# Patient Record
Sex: FEMALE | Race: WHITE | NOT HISPANIC OR LATINO | Employment: OTHER | URBAN - METROPOLITAN AREA
[De-identification: names, ages, dates, MRNs, and addresses within clinical notes are randomized per-mention and may not be internally consistent; named-entity substitution may affect disease eponyms.]

---

## 2017-08-23 ENCOUNTER — OFFICE VISIT (OUTPATIENT)
Dept: NEUROSURGERY | Facility: CLINIC | Age: 42
End: 2017-08-23

## 2017-08-23 VITALS
DIASTOLIC BLOOD PRESSURE: 88 MMHG | BODY MASS INDEX: 32.78 KG/M2 | SYSTOLIC BLOOD PRESSURE: 142 MMHG | HEIGHT: 63 IN | TEMPERATURE: 98.3 F | WEIGHT: 185 LBS

## 2017-08-23 DIAGNOSIS — G91.1 OBSTRUCTIVE HYDROCEPHALUS (HCC): Primary | ICD-10-CM

## 2017-08-23 PROCEDURE — 99214 OFFICE O/P EST MOD 30 MIN: CPT | Performed by: PHYSICIAN ASSISTANT

## 2017-08-23 RX ORDER — OMEPRAZOLE 40 MG/1
40 CAPSULE, DELAYED RELEASE ORAL DAILY
COMMUNITY

## 2017-08-23 RX ORDER — ESTRADIOL 2 MG/1
2 TABLET ORAL DAILY
COMMUNITY

## 2017-08-23 RX ORDER — CYCLOBENZAPRINE HCL 10 MG
10 TABLET ORAL 3 TIMES DAILY PRN
COMMUNITY
End: 2017-08-23

## 2017-08-23 RX ORDER — CARBAMAZEPINE 200 MG/1
200 TABLET ORAL 2 TIMES DAILY
COMMUNITY
End: 2018-11-15

## 2017-08-23 RX ORDER — DICLOFENAC SODIUM 75 MG/1
75 TABLET, DELAYED RELEASE ORAL 2 TIMES DAILY
COMMUNITY
End: 2017-08-23

## 2017-08-23 NOTE — PROGRESS NOTES
"Patient: Yaquelin Rojas  : 1975  Chart #: [id number]    Date of Service: 2017    CHIEF COMPLAINT: Headache    History of Present Illness Ms. Rojas is a 42-year-old female who is well-known to our clinic.  She has a history of hydrocephalus and has undergone multiple shunt interventions in the past. The last one being in the spring of . She developed an infection and had hardware removed. She underwent CSF diversion for a while then a new right frontal system was placed. She continued to have difficulties and developed an enlarged left occipital horn and underwent a separate shunt procedure to shunt the left occipital horn.   She should have two active and functioning shunt systems.  About a month ago she had a episode of \"feeling off balance\". This was transient and has not recurred. She thought at that time she should have her shunt checked since her last appointment was 3 years ago.  She has occasional headache. No nausea or vomiting. No visual disturbance.    The following portions of the patient's history were reviewed and updated as appropriate: allergies, current medications, past family history, past medical history, past social history, past surgical history and problem list.    Review of Systems   Constitutional: Negative for activity change, appetite change, chills, diaphoresis, fatigue, fever and unexpected weight change.   HENT: Positive for rhinorrhea. Negative for congestion, dental problem, drooling, ear discharge, ear pain, facial swelling, hearing loss, mouth sores, nosebleeds, postnasal drip, sinus pressure, sneezing, sore throat, tinnitus, trouble swallowing and voice change.    Eyes: Positive for photophobia. Negative for pain, discharge, redness, itching and visual disturbance.   Respiratory: Negative for apnea, cough, choking, chest tightness, shortness of breath, wheezing and stridor.    Cardiovascular: Negative for chest pain, palpitations and leg swelling. " "  Gastrointestinal: Positive for constipation. Negative for abdominal distention, abdominal pain, anal bleeding, blood in stool, diarrhea, nausea, rectal pain and vomiting.   Endocrine: Negative for cold intolerance, heat intolerance, polydipsia, polyphagia and polyuria.   Genitourinary: Negative for decreased urine volume, difficulty urinating, dysuria, enuresis, flank pain, frequency, genital sores, hematuria and urgency.   Musculoskeletal: Negative for arthralgias, back pain, gait problem, joint swelling, myalgias, neck pain and neck stiffness.   Skin: Negative for color change, pallor, rash and wound.   Allergic/Immunologic: Negative for environmental allergies, food allergies and immunocompromised state.   Neurological: Positive for numbness. Negative for dizziness, tremors, seizures, syncope, facial asymmetry, speech difficulty, weakness, light-headedness and headaches.   Hematological: Negative for adenopathy. Does not bruise/bleed easily.   Psychiatric/Behavioral: Negative for agitation, behavioral problems, confusion, decreased concentration, dysphoric mood, hallucinations, self-injury, sleep disturbance and suicidal ideas. The patient is not nervous/anxious and is not hyperactive.        Objective   Vital Signs: Blood pressure 142/88, temperature 98.3 °F (36.8 °C), height 63\" (160 cm), weight 185 lb (83.9 kg).  Physical Exam   Constitutional: She appears well-developed and well-nourished. No distress.   HENT:   Head: Normocephalic and atraumatic.   Eyes: EOM are normal. Pupils are equal, round, and reactive to light.   Cardiovascular: Normal rate and regular rhythm.    Pulmonary/Chest: Effort normal and breath sounds normal.   Abdominal: Soft. She exhibits no distension.   Skin:   Shunt system is palpated in the right frontal region with tubing extending down the neck. Pumps and refills easily. It a bit more difficult to palpate the left sided system, felt to be in the left occipital region   Psychiatric: " She has a normal mood and affect. Her behavior is normal. Thought content normal.   Nursing note and vitals reviewed.  Musculoskeletal:  Strength is intact in upper and lower extremities to direct testing.  Muscle tone is normal throughout.  Station and gait are normal.  Neurologic:  Gait: Able to tandem walk without difficulty.  Coordination is intact.  Finger to nose, heel-to-shin, rapid alternating movements.  Deep tendon reflexes: 2+ and symmetrical.  Sensation is intact to light touch throughout.  Patient is oriented to person, place, and time.      Assessment/Plan    Diagnosis: Hydrocephalus with numerous shunt interventions    Medical Decision Making: Ms. Rojas is doing well. We will go ahead and check a CT of the brain to make sure her shunt systems are in order. We will also check a shunt series. She will follow up with Dr. Brooke.                Darby Virk PA-C  Patient Care Team:  Dania Singleton MD as PCP - General (Family Medicine)  Dania Singleton MD as Referring Physician (Family Medicine)

## 2017-09-01 ENCOUNTER — HOSPITAL ENCOUNTER (OUTPATIENT)
Dept: GENERAL RADIOLOGY | Facility: HOSPITAL | Age: 42
Discharge: HOME | End: 2017-09-01

## 2017-09-01 ENCOUNTER — HOSPITAL ENCOUNTER (OUTPATIENT)
Dept: CT IMAGING | Facility: HOSPITAL | Age: 42
Discharge: HOME | End: 2017-09-01
Admitting: PHYSICIAN ASSISTANT

## 2017-09-01 ENCOUNTER — OFFICE VISIT (OUTPATIENT)
Dept: NEUROSURGERY | Facility: CLINIC | Age: 42
End: 2017-09-01

## 2017-09-01 VITALS — HEIGHT: 63 IN | TEMPERATURE: 97 F | BODY MASS INDEX: 32.96 KG/M2 | WEIGHT: 186 LBS

## 2017-09-01 DIAGNOSIS — G91.1 OBSTRUCTIVE HYDROCEPHALUS (HCC): ICD-10-CM

## 2017-09-01 DIAGNOSIS — Z92.89 HISTORY OF VENTRICULOPERITONEAL SHUNTING: ICD-10-CM

## 2017-09-01 DIAGNOSIS — G91.1 OBSTRUCTIVE HYDROCEPHALUS (HCC): Primary | ICD-10-CM

## 2017-09-01 PROCEDURE — 99212 OFFICE O/P EST SF 10 MIN: CPT | Performed by: NEUROLOGICAL SURGERY

## 2017-09-01 PROCEDURE — 74000 HC ABDOMEN KUB: CPT

## 2017-09-01 PROCEDURE — 71020 HC CHEST PA AND LATERAL: CPT

## 2017-09-01 PROCEDURE — 70450 CT HEAD/BRAIN W/O DYE: CPT

## 2017-09-01 PROCEDURE — 70250 X-RAY EXAM OF SKULL: CPT

## 2017-09-01 RX ORDER — FLUNISOLIDE 0.25 MG/ML
2 SOLUTION NASAL EVERY 12 HOURS
COMMUNITY

## 2017-09-01 NOTE — PROGRESS NOTES
Patient: Yaquelin Rojas  : 1975    Primary Care Provider: Dania Singleton MD    Requesting Provider: As above        History    Chief Complaint: Headache.    History of Present Illness: Ms. Rojas is a 42-year-old female who is well-known to our clinic.  She has a history of hydrocephalus and has undergone multiple shunt interventions in the past. The last one being in the spring of 2009. She developed an infection and had hardware removed. She underwent CSF diversion for a while then a new right frontal system was placed. She continued to have difficulties and developed an enlarged left occipital horn and underwent a separate shunt procedure to shunt the left occipital horn.   She should have two active and functioning shunt systems.  About a month ago she had a episode of feeling dizzy or off-balance. This was transient and has not recurred. She thought at that time she should have her shunt checked since her last appointment was 3 years ago.  She has occasional headache. No nausea or vomiting. No visual disturbance.    Review of Systems   Constitutional: Negative for activity change, appetite change, chills, diaphoresis, fatigue, fever and unexpected weight change.   HENT: Negative for congestion, dental problem, drooling, ear discharge, ear pain, facial swelling, hearing loss, mouth sores, nosebleeds, postnasal drip, rhinorrhea, sinus pressure, sneezing, sore throat, tinnitus, trouble swallowing and voice change.    Eyes: Negative for photophobia, pain, discharge, redness, itching and visual disturbance.   Respiratory: Negative for apnea, cough, choking, chest tightness, shortness of breath, wheezing and stridor.    Cardiovascular: Negative for chest pain, palpitations and leg swelling.   Gastrointestinal: Negative for abdominal distention, abdominal pain, anal bleeding, blood in stool, constipation, diarrhea, nausea, rectal pain and vomiting.   Endocrine: Negative for cold intolerance, heat  "intolerance, polydipsia, polyphagia and polyuria.   Genitourinary: Negative for decreased urine volume, difficulty urinating, dysuria, enuresis, flank pain, frequency, genital sores, hematuria and urgency.   Musculoskeletal: Negative for arthralgias, back pain, gait problem, joint swelling, myalgias, neck pain and neck stiffness.   Skin: Negative for color change, pallor, rash and wound.   Allergic/Immunologic: Negative for environmental allergies, food allergies and immunocompromised state.   Neurological: Positive for headaches. Negative for dizziness, tremors, seizures, syncope, facial asymmetry, speech difficulty, weakness, light-headedness and numbness.   Hematological: Negative for adenopathy. Does not bruise/bleed easily.   Psychiatric/Behavioral: Negative for agitation, behavioral problems, confusion, decreased concentration, dysphoric mood, hallucinations, self-injury, sleep disturbance and suicidal ideas. The patient is not nervous/anxious and is not hyperactive.    All other systems reviewed and are negative.      The patient's past medical history, past surgical history, family history, and social history have been reviewed at length in the electronic medical record.    Physical Exam:   Temp 97 °F (36.1 °C)  Ht 63\" (160 cm)  Wt 186 lb (84.4 kg)  BMI 32.95 kg/m2  Yaquelin is awake and alert and appears her usual self.  Her speech is fluent and she follows all commands.  There is no pronator drift.  Her gait is stable and independent.  Both shunt valves pump and refill easily.    Medical Decision Making    Data Review:   Her x-ray shunt series demonstrates both of her shunt systems which are active to be contiguous.  Her CT of the brain is unchanged when compared to a study of 2014.  There are multiple shunt catheters in place.    Diagnosis:   Hydrocephalus, stable without evidence of shunt malfunction.    Treatment Options:   Yaquelin will follow-up as needed.       Diagnosis Plan   1. Obstructive " hydrocephalus     2. History of ventriculoperitoneal shunting         Scribed for Rosas Brooke MD by Ally Shipman CMA on 09/01/2017 at 12:11 PM    I, Dr. Brooke, personally performed the services described in the documentation, as scribed in my presence, and it is both accurate and complete.

## 2018-06-18 ENCOUNTER — TELEPHONE (OUTPATIENT)
Dept: NEUROSURGERY | Facility: CLINIC | Age: 43
End: 2018-06-18

## 2018-06-18 DIAGNOSIS — R56.9 SEIZURE-LIKE ACTIVITY (HCC): ICD-10-CM

## 2018-06-18 DIAGNOSIS — G91.1 OBSTRUCTIVE HYDROCEPHALUS (HCC): Primary | ICD-10-CM

## 2018-06-18 DIAGNOSIS — Z98.2 S/P VP SHUNT: ICD-10-CM

## 2018-06-18 NOTE — TELEPHONE ENCOUNTER
Pt's mother called in to let us know that the patient had a seizure.  She wanted to get an appointment with Dr. Brooke scheduled.  Does she need to see us and does she need any scans prior?

## 2018-06-18 NOTE — TELEPHONE ENCOUNTER
Spoke with patient's mother. Patient has seizure medication: Dr. Singleton manages.   This is the first seizure that she had since her last appointment; her eyes rolled back and she slumped out of a chair. She has no memory of the event.   I'll put in orders for a CT head without and a shunt series. She was also told to make sure the doctor prescribing her tegretol is aware of this event.

## 2018-06-27 ENCOUNTER — OFFICE VISIT (OUTPATIENT)
Dept: NEUROSURGERY | Facility: CLINIC | Age: 43
End: 2018-06-27

## 2018-06-27 ENCOUNTER — HOSPITAL ENCOUNTER (OUTPATIENT)
Dept: CT IMAGING | Facility: HOSPITAL | Age: 43
Discharge: HOME | End: 2018-06-27
Admitting: PHYSICIAN ASSISTANT

## 2018-06-27 VITALS — TEMPERATURE: 97.7 F | HEIGHT: 63 IN | WEIGHT: 186 LBS | BODY MASS INDEX: 32.96 KG/M2

## 2018-06-27 DIAGNOSIS — R56.9 SEIZURE-LIKE ACTIVITY (HCC): ICD-10-CM

## 2018-06-27 DIAGNOSIS — Z98.2 S/P VP SHUNT: ICD-10-CM

## 2018-06-27 DIAGNOSIS — Z92.89 HISTORY OF VENTRICULOPERITONEAL SHUNTING: Primary | ICD-10-CM

## 2018-06-27 DIAGNOSIS — G91.1 OBSTRUCTIVE HYDROCEPHALUS (HCC): ICD-10-CM

## 2018-06-27 PROCEDURE — 99213 OFFICE O/P EST LOW 20 MIN: CPT | Performed by: NEUROLOGICAL SURGERY

## 2018-06-27 PROCEDURE — 70450 CT HEAD/BRAIN W/O DYE: CPT

## 2018-06-27 NOTE — PROGRESS NOTES
Patient: Yaquelin Rojas  : 1975    Primary Care Provider: Dania Singleton MD    Requesting Provider: As above        History    Chief Complaint: Syncopal episode.    History of Present Illness: Ms. Rojas is a 43-year-old female who is well-known to our clinic.  She has a history of hydrocephalus and has undergone multiple shunt interventions in the past. The last one being in the spring of 2009. She developed an infection and had hardware removed. She underwent CSF diversion for a while then a new right frontal system was placed. She continued to have difficulties and developed an enlarged left occipital horn and underwent a separate shunt procedure to shunt the left occipital horn.   She should have two active and functioning shunt systems.  Recently she suffered a syncopal episode.  There was no overt seizure activity.  She was somewhat sleepy thereafter.  Her mother reports that she's not had a seizure for 9 or 10 years.  She continues on Tegretol.  For a couple of days she had some headache but that has gone away.  She's had no nausea or vomiting.  Her appetite has been good.    Review of Systems   Constitutional: Negative for activity change, appetite change, chills, diaphoresis, fatigue, fever and unexpected weight change.   HENT: Negative for congestion, dental problem, drooling, ear discharge, ear pain, facial swelling, hearing loss, mouth sores, nosebleeds, postnasal drip, rhinorrhea, sinus pressure, sneezing, sore throat, tinnitus, trouble swallowing and voice change.    Eyes: Negative for photophobia, pain, discharge, redness, itching and visual disturbance.   Respiratory: Negative for apnea, cough, choking, chest tightness, shortness of breath, wheezing and stridor.    Cardiovascular: Negative for chest pain, palpitations and leg swelling.   Gastrointestinal: Positive for constipation and diarrhea. Negative for abdominal distention, abdominal pain, anal bleeding, blood in stool, nausea,  "rectal pain and vomiting.   Endocrine: Negative for cold intolerance, heat intolerance, polydipsia, polyphagia and polyuria.   Genitourinary: Negative for decreased urine volume, difficulty urinating, dysuria, enuresis, flank pain, frequency, genital sores, hematuria and urgency.   Musculoskeletal: Negative for arthralgias, back pain, gait problem, joint swelling, myalgias, neck pain and neck stiffness.   Skin: Negative for color change, pallor, rash and wound.   Allergic/Immunologic: Negative for environmental allergies, food allergies and immunocompromised state.   Neurological: Positive for seizures and headaches. Negative for dizziness, tremors, syncope, facial asymmetry, speech difficulty, weakness, light-headedness and numbness.   Hematological: Negative for adenopathy. Does not bruise/bleed easily.   Psychiatric/Behavioral: Negative for agitation, behavioral problems, confusion, decreased concentration, dysphoric mood, hallucinations, self-injury, sleep disturbance and suicidal ideas. The patient is not nervous/anxious and is not hyperactive.        The patient's past medical history, past surgical history, family history, and social history have been reviewed at length in the electronic medical record.    Physical Exam:   Temp 97.7 °F (36.5 °C)   Ht 160 cm (63\")   Wt 84.4 kg (186 lb)   BMI 32.95 kg/m²   Yaquelin is awake and alert and cooperative.  Her speech is fluent.  She follows all commands.  Extraocular movements are intact.  Facial symmetry is preserved.  Her left occipital and right frontal shunt valves pump and refill easily.    Medical Decision Making    Data Review:   CT scan of the brain is unchanged from the study of September of last year.    Diagnosis:   1.  Probable recent seizure.  2.  History of hydrocephalus status post multiple shunt procedures.    Treatment Options:   We will sit tight but if Yaquelin develops another episode then we will need to check her Tegretol level and some " electrolytes and consider an EEG.  I don't see any signs of shunt malfunction.       Diagnosis Plan   1. History of ventriculoperitoneal shunting     2. Seizure-like activity     3. Obstructive hydrocephalus         Scribed for Rosas Brooke MD by Ally Shipman CMA on 06/27/2018 at 11:31 AM    I, Dr. Brooke, personally performed the services described in the documentation, as scribed in my presence, and it is both accurate and complete.

## 2018-10-18 ENCOUNTER — OFFICE VISIT (OUTPATIENT)
Dept: NEUROLOGY | Facility: CLINIC | Age: 43
End: 2018-10-18

## 2018-10-18 VITALS
SYSTOLIC BLOOD PRESSURE: 127 MMHG | BODY MASS INDEX: 32.96 KG/M2 | DIASTOLIC BLOOD PRESSURE: 82 MMHG | HEART RATE: 106 BPM | WEIGHT: 186 LBS | HEIGHT: 63 IN

## 2018-10-18 DIAGNOSIS — G40.209 COMPLEX PARTIAL SEIZURES WITH IMPAIRED CONSCIOUSNESS AT ONSET (HCC): Primary | ICD-10-CM

## 2018-10-18 DIAGNOSIS — R55 DROP ATTACK: ICD-10-CM

## 2018-10-18 PROCEDURE — 99205 OFFICE O/P NEW HI 60 MIN: CPT | Performed by: PSYCHIATRY & NEUROLOGY

## 2018-10-18 NOTE — PROGRESS NOTES
Subjective   Yaquelin Rojas is a 43 y.o. female.     Chief Complaint   Patient presents with   • Syncope       History of Present Illness     44 yo woman seen today in consultation at the request of Dr Dania Singleton for evaluation of drop attacks. She has been f/b Dr Brooke with h/o congenital hydrocephalus, and has undergone multiple shunt interventions in the past, last spring 2009. 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 She underwent CSF diversion for a while then a new right frontal system was placed. She continued to have difficulties and developed an enlarged left occipital horn and underwent a separate shunt procedure to shunt the left occipital horn.   Has two active and functioning shunt systems. He noted in June that she had recently suffered a syncopal episode with no overt seizure activity, f/b sleepiness.  Mother reported that she's not had a seizure for 9 or 10 years.  She continues on Tegretol. Had headache for couple days, resolved, no nausea or vomiting. Head CT from 6/18 with multiple shunts pers reviewed. Father witnessed that event, was banging glass in right hand on counter right before, was sitting, fell back in chair. Became aware quickly afterwards, on floor.   Second event about 3 weeks ago witnessed by mother, was standing holding drink, collapsed onto floor, no warning, fell forward. On the ground just a few seconds, recovered right away. Spoke and got up. No recent headaches. No change in vision, strength, sensation.  Seizures in past were different, had shaking, gritting teeth, increased tone, mother can't remember if any unilateral sx, and pt had no warning for these.   Seizures started at age 3 mo, first surgery back then.   EEGs in past have shown spikes per mother, done through [facility] she believes, previously at .     No Known Allergies    Current Outpatient Prescriptions on File Prior to Visit   Medication Sig Dispense Refill   • carBAMazepine (TEGretol) 200 MG tablet Take 200  "mg by mouth 2 (Two) Times a Day.     • estradiol (ESTRACE) 2 MG tablet Take 2 mg by mouth Daily.     • flunisolide (NASALIDE) 25 MCG/ACT (0.025%) solution nasal spray Inhale 2 sprays Every 12 (Twelve) Hours.     • omeprazole (priLOSEC) 40 MG capsule Take 40 mg by mouth Daily.       No current facility-administered medications on file prior to visit.        Past Medical History:   Diagnosis Date   • Diabetes mellitus (CMS/HCC)    • Thyroid disease        Past Surgical History:   Procedure Laterality Date   • SHUNT REVISION  2009       Social History     Social History   • Marital status: Single     Spouse name: N/A   • Number of children: N/A   • Years of education: N/A     Occupational History   • Not on file.     Social History Main Topics   • Smoking status: Never Smoker   • Smokeless tobacco: Never Used   • Alcohol use No   • Drug use: No   • Sexual activity: Defer     Other Topics Concern   • Not on file     Social History Narrative   • No narrative on file       Review of Systems   Constitutional: Negative.    HENT: Negative.    Eyes: Negative.    Respiratory: Negative.    Cardiovascular: Negative.    Gastrointestinal: Negative.    Endocrine: Negative.    Genitourinary: Negative.    Musculoskeletal: Negative.    Skin: Negative.    Allergic/Immunologic: Negative.    Hematological: Negative.    Psychiatric/Behavioral: Negative.        Objective   Blood pressure 127/82, pulse 106, height 160 cm (63\"), weight 84.4 kg (186 lb).    Physical Exam   Constitutional: She appears well-developed and well-nourished.   HENT:   Head: Normocephalic and atraumatic.   Eyes: Pupils are equal, round, and reactive to light.   Pulmonary/Chest: Effort normal.   Neurological: She has a normal Finger-Nose-Finger Test and a normal Heel to Leyva Test.   Skin: Skin is warm and dry.   Psychiatric: She has a normal mood and affect. Her speech is normal.   Nursing note and vitals reviewed.      Neurologic Exam     Mental Status   Oriented to " person.   Oriented to place.   Attention: normal. Concentration: normal.   Speech: speech is normal   Level of consciousness: alert  Knowledge: consistent with education.   Able to name object. Able to repeat. Abnormal comprehension.   Apparent developmental delay; comprehension limited     Cranial Nerves     CN II   Visual fields full to confrontation.     CN III, IV, VI   Pupils are equal, round, and reactive to light.  Pupils: (discs poorly visualized, left strabismus)  Right pupil: Reactivity: brisk. Consensual response: intact. Accommodation: intact.   Left pupil: Reactivity: brisk. Consensual response: intact. Accommodation: intact.   CN III: no CN III palsy  CN VI: no CN VI palsy  Nystagmus: none   Diplopia: none  Ophthalmoparesis: none  Upgaze: normal  Downgaze: normal  Conjugate gaze: present    CN V   Facial sensation intact.     CN VII   Facial expression full, symmetric.   Right facial weakness: none  Left facial weakness: none    CN VIII   CN VIII normal.   Hearing: intact    CN IX, X   CN IX normal.   Palate: symmetric    CN XI   CN XI normal.   Right sternocleidomastoid strength: normal  Left sternocleidomastoid strength: normal  Right trapezius strength: normal  Left trapezius strength: normal    CN XII   CN XII normal.   Tongue: not atrophic  Fasciculations: absent  Tongue deviation: none    Motor Exam   Muscle bulk: normal  Overall muscle tone: normal  Right arm pronator drift: absent  Left arm pronator drift: absent    Strength   Strength 5/5 except as noted.     Sensory Exam   Light touch normal.   Vibration normal.   Proprioception normal.     Gait, Coordination, and Reflexes     Gait  Gait: wide-based    Coordination   Finger to nose coordination: normal  Heel to shin coordination: normal    Tremor   Resting tremor: absent  Intention tremor: absent  Action tremor: absent    Reflexes   Reflexes 2+ except as noted.   Right ankle clonus: absent  Left ankle clonus: absent      Assessment/Plan      Yaquelin was seen today for syncope.    Diagnoses and all orders for this visit:    Complex partial seizures with impaired consciousness at onset (CMS/HCC)  -     EEG (Hospital Performed); Future    Drop attack      Discussion/Summary:  Unclear if recent events are epileptic, but concerning with h/o epilepsy (and normally functioning shunt making sudden rise in ICP unlikely). Pt recently had CBZ drawn, will request that and previous levels (has gained weight). Will also plan on EEG, discussed potential medication changes (pt reluctant). Reassured no medication changes without discussion.   Return in about 4 weeks (around 11/15/2018).

## 2018-11-12 ENCOUNTER — HOSPITAL ENCOUNTER (OUTPATIENT)
Dept: NEUROLOGY | Facility: HOSPITAL | Age: 43
Discharge: HOME | End: 2018-11-12
Attending: PSYCHIATRY & NEUROLOGY | Admitting: PSYCHIATRY & NEUROLOGY

## 2018-11-12 DIAGNOSIS — G40.209 COMPLEX PARTIAL SEIZURES WITH IMPAIRED CONSCIOUSNESS AT ONSET (HCC): ICD-10-CM

## 2018-11-12 PROCEDURE — 95816 EEG AWAKE AND DROWSY: CPT

## 2018-11-14 ENCOUNTER — TELEPHONE (OUTPATIENT)
Dept: NEUROLOGY | Facility: CLINIC | Age: 43
End: 2018-11-14

## 2018-11-14 NOTE — TELEPHONE ENCOUNTER
----- Message from Kareen Brown MD sent at 11/13/2018 10:50 AM EST -----  I believe we requested both old records from [facility], including any EEGs, and a recent tegretol/carbamazepine level that was drawn by ?PCP (mother will know). Pls look into getting these. Thanks

## 2018-11-14 NOTE — TELEPHONE ENCOUNTER
Requested records from [facility]. Waiting on them to fax them back to me. They are to fax to [phone]. Thanks!!

## 2018-11-15 ENCOUNTER — OFFICE VISIT (OUTPATIENT)
Dept: NEUROLOGY | Facility: CLINIC | Age: 43
End: 2018-11-15

## 2018-11-15 VITALS
BODY MASS INDEX: 32.96 KG/M2 | WEIGHT: 186 LBS | SYSTOLIC BLOOD PRESSURE: 126 MMHG | HEIGHT: 63 IN | DIASTOLIC BLOOD PRESSURE: 78 MMHG

## 2018-11-15 DIAGNOSIS — G40.219 PARTIAL SYMPTOMATIC EPILEPSY WITH COMPLEX PARTIAL SEIZURES, INTRACTABLE, WITHOUT STATUS EPILEPTICUS (HCC): Primary | ICD-10-CM

## 2018-11-15 PROCEDURE — 99214 OFFICE O/P EST MOD 30 MIN: CPT | Performed by: PSYCHIATRY & NEUROLOGY

## 2018-11-15 RX ORDER — CARBAMAZEPINE 100 MG/1
300 TABLET, EXTENDED RELEASE ORAL EVERY 12 HOURS SCHEDULED
Qty: 180 TABLET | Refills: 1 | Status: SHIPPED | OUTPATIENT
Start: 2018-11-15

## 2018-11-15 NOTE — PROGRESS NOTES
Subjective   Yaquelin Rojas is a 43 y.o. female.     Chief Complaint   Patient presents with   • Seizures       History of Present Illness     Pt originally seen 10/18/18 for evaluation of drop attacks. She has been f/b Dr Brooke with h/o congenital hydrocephalus, and has undergone multiple shunt interventions in the past, last spring 2009. 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 She underwent CSF diversion for a while then a new right frontal system was placed. Then developed enlarged left occipital horn, underwent a separate shunt procedure to shunt the left occipital horn.   Has two active and functioning shunt systems. He noted in June that she had recently suffered a syncopal episode with no overt seizure activity, f/b sleepiness.  Mother reported that she's not had a seizure for 9 or 10 years.  She continues on Tegretol. Had headache for couple days, resolved, no nausea or vomiting. Head CT from 6/18 with multiple shunts pers reviewed. Father witnessed that event, was banging glass in right hand on counter right before, was sitting, fell back in chair. Became aware quickly afterwards, on floor.   Second event about 3 weeks ago witnessed by mother, was standing holding drink, collapsed onto floor, no warning, fell forward. On the ground just a few seconds, recovered right away. Spoke and got up. No recent headaches. No change in vision, strength, sensation.  Seizures in past were different, had shaking, gritting teeth, increased tone, mother can't remember if any unilateral sx, and pt had no warning for these.   Seizures started at age 3 mo, first surgery back then.   EEGs in past have shown spikes per mother, done through [facility] she believes, previously at .   Today: EEG at [facility] 11/2/18 showed intermittent generalized slow, independent intermittent left temporal slow, and sharp/slow wave, left temporo-parietal, noted to be ill-defined in most instances, but more clearly epileptiform in morphology at one  "point in the recording.  CBZ level 10/9/19 was 6.9. Both episodes in middle of day. No further episodes of LOC or abnormal mvmt. Tolerate        No Known Allergies    Current Outpatient Medications on File Prior to Visit   Medication Sig Dispense Refill   • estradiol (ESTRACE) 2 MG tablet Take 2 mg by mouth Daily.     • flunisolide (NASALIDE) 25 MCG/ACT (0.025%) solution nasal spray Inhale 2 sprays Every 12 (Twelve) Hours.     • omeprazole (priLOSEC) 40 MG capsule Take 40 mg by mouth Daily.     • [DISCONTINUED] carBAMazepine (TEGretol) 200 MG tablet Take 200 mg by mouth 2 (Two) Times a Day.       No current facility-administered medications on file prior to visit.        Past Medical History:   Diagnosis Date   • Diabetes mellitus (CMS/HCC)    • Thyroid disease        Past Surgical History:   Procedure Laterality Date   • SHUNT REVISION  2009       Social History     Socioeconomic History   • Marital status: Single     Spouse name: Not on file   • Number of children: Not on file   • Years of education: Not on file   • Highest education level: Not on file   Social Needs   • Financial resource strain: Not on file   • Food insecurity - worry: Not on file   • Food insecurity - inability: Not on file   • Transportation needs - medical: Not on file   • Transportation needs - non-medical: Not on file   Occupational History   • Not on file   Tobacco Use   • Smoking status: Never Smoker   • Smokeless tobacco: Never Used   Substance and Sexual Activity   • Alcohol use: No   • Drug use: No   • Sexual activity: Defer   Other Topics Concern   • Not on file   Social History Narrative   • Not on file       Review of Systems   Constitutional: Negative for fever and unexpected weight change.   Respiratory: Negative for cough and shortness of breath.    Cardiovascular: Negative for chest pain.   Neurological: Negative for seizures.       Objective   Blood pressure 126/78, height 160 cm (63\"), weight 84.4 kg (186 lb).    Physical Exam "   Constitutional: She is oriented to person, place, and time. She appears well-developed and well-nourished.   HENT:   Head: Normocephalic and atraumatic.   Eyes: EOM are normal.   Pulmonary/Chest: Effort normal.   Neurological: She is oriented to person, place, and time. She has normal strength. Gait normal.   Skin: Skin is warm and dry.   Psychiatric: She has a normal mood and affect. Her speech is normal.   Nursing note and vitals reviewed.      Neurologic Exam     Mental Status   Oriented to person, place, and time.   Speech: speech is normal   Level of consciousness: alert    Cranial Nerves     CN III, IV, VI   Extraocular motions are normal.     CN VII   Facial expression full, symmetric.     CN IX, X   CN IX normal.     CN XI   CN XI normal.     CN XII   CN XII normal.     Motor Exam     Strength   Strength 5/5 throughout.     Gait, Coordination, and Reflexes     Gait  Gait: normal      Assessment/Plan     Yaquelin was seen today for seizures.    Diagnoses and all orders for this visit:    Partial symptomatic epilepsy with complex partial seizures, intractable, without status epilepticus (CMS/HCC)    Other orders  -     carBAMazepine XR (TEGretol  XR) 100 MG 12 hr tablet; Take 3 tablets by mouth Every 12 (Twelve) Hours.      Discussion/Summary:  .I spent  25  minutes face to face with the patient, with 15 minutes spent  counselling:  Reviewed test results, likelihood of events being partial onset seizure, left hemisphere focus. Discussed tx alternatives, but given relatively low CBZ level (and apparently on CBZ immed release, not ER), will increase dose CBZ ER to 300mg bid. Discussed potential SEs. Pt to call if not tolerated or further events. Discussed option of adding another med, eg LVT, if not tolerated at higher dose.   Return in about 3 months (around 2/15/2019).

## 2021-04-05 ENCOUNTER — IMMUNIZATION (OUTPATIENT)
Dept: VACCINE CLINIC | Facility: HOSPITAL | Age: 46
End: 2021-04-05

## 2021-04-05 PROCEDURE — 0001A: CPT | Performed by: INTERNAL MEDICINE

## 2021-04-05 PROCEDURE — 91300 HC SARSCOV02 VAC 30MCG/0.3ML IM: CPT | Performed by: INTERNAL MEDICINE

## 2021-04-27 ENCOUNTER — IMMUNIZATION (OUTPATIENT)
Dept: VACCINE CLINIC | Facility: HOSPITAL | Age: 46
End: 2021-04-27

## 2021-04-27 PROCEDURE — 0002A: CPT | Performed by: INTERNAL MEDICINE

## 2021-04-27 PROCEDURE — 91300 HC SARSCOV02 VAC 30MCG/0.3ML IM: CPT | Performed by: INTERNAL MEDICINE

## 2022-09-23 ENCOUNTER — TELEPHONE (OUTPATIENT)
Dept: SURGERY | Facility: CLINIC | Age: 47
End: 2022-09-23

## 2022-09-23 NOTE — TELEPHONE ENCOUNTER
"Tried to call patient, no answer, \"number is not a working number\" Mailed letter to schedule an open access colonoscopy. Please update phone number in her chart.  "

## 2022-09-29 RX ORDER — BISACODYL 5 MG
TABLET, DELAYED RELEASE (ENTERIC COATED) ORAL
Qty: 4 TABLET | Refills: 0 | Status: SHIPPED | OUTPATIENT
Start: 2022-09-29

## 2022-09-29 RX ORDER — POLYETHYLENE GLYCOL 3350 17 G/17G
POWDER, FOR SOLUTION ORAL
Qty: 238 EACH | Refills: 0 | Status: SHIPPED | OUTPATIENT
Start: 2022-09-29

## 2022-09-29 NOTE — TELEPHONE ENCOUNTER
PRESCREENING FOR OPEN ACCESS SCHEDULING    Yaquelin Rojas, 1975  [id number]    09/29/22    If, the patient answers yes to any of the following questions the provider will be informed prior to scheduling open access for approval and documented in the chart.    []  Yes  [x] No    1. Have you ever had a colonoscopy in the past?      When:        Where:       Polyps or other:     []  Yes  [x] No    2. Family history of colon cancer?      Relation:       Age of onset:       Do you currently have any of the following?    []  Yes  [x] No  Rectal bleeding, if so, how long?     []  Yes  [x] No  Abdominal pain, if so, how long?    []  Yes  [x] No  Constipation, if so, how long?    []  Yes  [x] No  Diarrhea, if so, how long?    []  Yes  [x] No  Weight loss, is so, how much?    [] Yes  [x] No  Small caliber stool, if so, how long?      Have you ever had any of the following conditions?    [] Yes  [x] No  Heart attack?      When?       Last cardiac workup?     Blood thinners?    [] Yes  [x] No   Lung problems, asthma or COPD?  [] Yes  [x] No  Oxygen required?       [] Yes  [x] No  Stroke?     [] Yes  [x] No  Have you ever had a reaction to anesthesia?      Scheduled with Dr. Haywood 10/11/22 at [facility].

## 2022-09-30 DIAGNOSIS — Z12.11 ENCOUNTER FOR SCREENING COLONOSCOPY: Primary | ICD-10-CM

## 2022-09-30 RX ORDER — SODIUM CHLORIDE, SODIUM LACTATE, POTASSIUM CHLORIDE, CALCIUM CHLORIDE 600; 310; 30; 20 MG/100ML; MG/100ML; MG/100ML; MG/100ML
50 INJECTION, SOLUTION INTRAVENOUS CONTINUOUS
Status: CANCELLED | OUTPATIENT
Start: 2022-09-30

## 2022-09-30 RX ORDER — SODIUM CHLORIDE 0.9 % (FLUSH) 0.9 %
10 SYRINGE (ML) INJECTION EVERY 12 HOURS SCHEDULED
Status: CANCELLED | OUTPATIENT
Start: 2022-09-30

## 2022-09-30 RX ORDER — SODIUM CHLORIDE 0.9 % (FLUSH) 0.9 %
10 SYRINGE (ML) INJECTION AS NEEDED
Status: CANCELLED | OUTPATIENT
Start: 2022-09-30

## 2022-10-06 ENCOUNTER — TELEPHONE (OUTPATIENT)
Dept: SURGERY | Facility: CLINIC | Age: 47
End: 2022-10-06

## 2022-10-07 ENCOUNTER — TELEPHONE (OUTPATIENT)
Dept: SURGERY | Facility: CLINIC | Age: 47
End: 2022-10-07

## 2022-10-07 NOTE — TELEPHONE ENCOUNTER
Caller: DANTE ROWAN     Relationship to patient: SELF     Best call back number: [phone]    Patient is needing: PT NEEDS TO RESCHEDULE COLONOSCOPY TO LATER TIME. PLEASE CALL PT BACK TO RESCHEDULE. WE DID SPEAK TO ELISEO AND SHE SAID TO SEND MESSAGE SINCE MA WAS UNAVAILABLE.

## 2022-10-07 NOTE — TELEPHONE ENCOUNTER
Called patient back to reschedule colonoscopy from 10/11/22 to 10/19/22 due to transportation issues. Patient has been changed with  OR to 10/19/22 for open access colonoscopy with Dr. Haywood.

## 2022-10-14 ENCOUNTER — TELEPHONE (OUTPATIENT)
Dept: SURGERY | Facility: CLINIC | Age: 47
End: 2022-10-14

## 2022-10-18 ENCOUNTER — TELEPHONE (OUTPATIENT)
Dept: SURGERY | Facility: CLINIC | Age: 47
End: 2022-10-18

## 2022-10-18 NOTE — TELEPHONE ENCOUNTER
"Patient called stating that \"she cannot drink that stuff, it smells like gasoline, just cancel it\". I asked her what she mixed the Miralax with. She stated,\"I have not tried to drink it at all, just cancel it!\"  "

## 2023-10-13 ENCOUNTER — APPOINTMENT (OUTPATIENT)
Dept: CT IMAGING | Facility: HOSPITAL | Age: 48
End: 2023-10-13
Payer: MEDICARE

## 2023-10-13 ENCOUNTER — HOSPITAL ENCOUNTER (EMERGENCY)
Facility: HOSPITAL | Age: 48
Discharge: HOME | End: 2023-10-13
Attending: EMERGENCY MEDICINE
Payer: MEDICARE

## 2023-10-13 VITALS
DIASTOLIC BLOOD PRESSURE: 94 MMHG | SYSTOLIC BLOOD PRESSURE: 134 MMHG | OXYGEN SATURATION: 96 % | WEIGHT: 170 LBS | HEART RATE: 101 BPM | RESPIRATION RATE: 18 BRPM | HEIGHT: 64 IN | TEMPERATURE: 98 F | BODY MASS INDEX: 29.02 KG/M2

## 2023-10-13 DIAGNOSIS — R51.9 GENERALIZED HEADACHE: Primary | ICD-10-CM

## 2023-10-13 DIAGNOSIS — Z98.2 S/P VP SHUNT: ICD-10-CM

## 2023-10-13 PROCEDURE — 70450 CT HEAD/BRAIN W/O DYE: CPT

## 2023-10-13 PROCEDURE — 99284 EMERGENCY DEPT VISIT MOD MDM: CPT

## 2023-10-13 RX ORDER — BUTALBITAL, ACETAMINOPHEN AND CAFFEINE 50; 325; 40 MG/1; MG/1; MG/1
1 TABLET ORAL ONCE
Status: COMPLETED | OUTPATIENT
Start: 2023-10-13 | End: 2023-10-13

## 2023-10-13 RX ADMIN — BUTALBITAL, ACETAMINOPHEN, AND CAFFEINE 1 TABLET: 50; 325; 40 TABLET ORAL at 21:07

## 2023-10-13 NOTE — ED PROVIDER NOTES
EMERGENCY DEPARTMENT ENCOUNTER    Pt Name: Yaquelin Rojas  MRN: [id number]  Pt :   1975  Room Number:    Date of encounter:  10/13/2023  PCP: Dania Singleton MD  ED Provider: SAM Childress    Historian: Patient    HPI:  Chief Complaint: Headache    Context: Yaquelin Rojas is a 48 y.o. female who presents to the ED c/o headache.  Patient reports that she was mowing yesterday and started to experience a headache.  Mother is present at interview exam reports that patient has had intermittent headaches that they are concerned may be related to her shunt.  Patient states that her symptoms have improved today but the were concerned about possibility of malfunction of the shunt and presented to the ER for further evaluation.  Patient denies any additional associated symptoms on exam.  Patient follows with Dr. Brooke with neurosurgery for her ventriculoperitoneal shunt.  HPI     REVIEW OF SYSTEMS  A chief complaint appropriate review of systems was completed and is negative except as noted in the HPI.     PAST MEDICAL HISTORY  Past Medical History:   Diagnosis Date    Diabetes mellitus     Thyroid disease        PAST SURGICAL HISTORY  Past Surgical History:   Procedure Laterality Date    SHUNT REVISION         FAMILY HISTORY  Family History   Problem Relation Age of Onset    Diabetes Father     Diabetes Paternal Grandmother        SOCIAL HISTORY  Social History     Socioeconomic History    Marital status: Single   Tobacco Use    Smoking status: Never    Smokeless tobacco: Never   Substance and Sexual Activity    Alcohol use: No    Drug use: No    Sexual activity: Defer       ALLERGIES  Patient has no known allergies.    PHYSICAL EXAM  Physical Exam  GENERAL:   Appears in no acute distress.   HENT: Nares patent.  EYES: No scleral icterus.  CV: Regular rhythm, regular rate.  RESPIRATORY: Normal effort.   ABDOMEN: Soft, nontender  MUSCULOSKELETAL: No deformities.   NEURO: Alert, moves all  extremities, follows commands.  SKIN: Warm, dry, no rash visualized.    I have reviewed the triage vital signs and nursing notes.     LAB RESULTS  No results found for this or any previous visit.    If labs were ordered, I independently reviewed the results and considered them in treating the patient.    RADIOLOGY  CT Head Without Contrast   Final Result   Impression:      1. There are 3 stable ventriculostomy catheters with no evidence of hydrocephalus.   2. Stable encephalomalacia in the right parietal lobe and left frontal lobe.   3. Prominence of the basal cisterns which may be due to cerebellar volume loss.   4. No acute findings.            Electronically Signed: Darnell Crabtree MD     10/13/2023 8:26 PM EDT     Workstation ID: [id number]        [x] Radiologist's Report Reviewed:  I ordered and independently reviewed the above noted radiographic studies.  See radiologist's dictation for official interpretation.      PROCEDURES    Procedures    No orders to display       MEDICATIONS GIVEN IN ER    Medications   butalbital-acetaminophen-caffeine (FIORICET, ESGIC) -40 MG per tablet 1 tablet (1 tablet Oral Given 10/13/23 2107)       MEDICAL DECISION MAKING, PROGRESS, and CONSULTS   Medical Decision Making  Problems Addressed:  Generalized headache: complicated acute illness or injury  S/P  shunt: chronic illness or injury    Amount and/or Complexity of Data Reviewed  Radiology: ordered.    Risk  Prescription drug management.        All labs have been independently reviewed by me.  All radiology studies have been reviewed by me and the radiologist dictating the report.  All EKG's have been independently viewed by me.    [] Discussed with radiology regarding test interpretation:    Discussion below represents my analysis of pertinent findings related to patient's condition, differential diagnosis, treatment plan and final disposition.    Differential diagnosis:  The differential diagnosis associated with the  patient's presentation includes: Hydrocephalus, generalized headache, stress headache, migraine headache, tension headache, drug adverse effect, seasonal allergies, sinusitis, viral syndrome    Additional sources  Discussed/ obtained information from independent historians:   [] Spouse  [] Parent  [x] Family member  [] Friend  [] EMS   [] Other:  External (non-ED) record review:   [] Inpatient record:   [x] Office record: Last office visit from neurosurgery dated June 27, 2018 reviewed.   [] Outpatient record:   [] Prior Outpatient labs:   [] Prior Outpatient radiology:   [] Primary Care record:   [] Outside ED record:   [] Other:   Patient's care impacted by:   [] Diabetes  [] Hypertension  [] Hyperlipidemia  [] Hypothyroidism   [] Coronary Artery Disease   [] COPD   [] Cancer   [] Obesity  [] GERD   [] Tobacco Abuse   [] Substance Abuse    [] Anxiety   [] Depression   [x] Other: History of obstructive hydrocephalus, status post  shunt  Care significantly affected by Social Determinants of Health (housing and economic circumstances, unemployment)    [] Yes     [x] No   If yes, Patient's care significantly limited by  Social Determinants of Health including:   [] Inadequate housing   [] Low income   [] Alcoholism and drug addiction in family   [] Problems related to primary support group   [] Unemployment   [] Problems related to employment   [] Other Social Determinants of Health:     Shared decision making:  I had a discussion with the patient/family regarding diagnosis, diagnostic results, treatment plan, and medications.  The patient/family indicated understanding of these instructions.  I spent adequate time at the bedside preceding discharge necessary to personally discuss the aftercare instructions, giving patient education, providing explanations of the results of our evaluations/findings, and my decision making to assure that the patient/family understand the plan of care.  Time was allotted to answer  questions at that time and throughout the ED course.  Emphasis was placed on timely follow-up after discharge.  I also discussed the potential for the development of an acute emergent condition requiring further evaluation, admission, or even surgical intervention. I discussed that we found nothing during the visit today indicating the need for further workup, admission, or the presence of an unstable medical condition.  I encouraged the patient to return to the emergency department immediately for ANY concerns, worsening, new complaints, or if symptoms persist and unable to seek follow-up in a timely fashion.  The patient/family expressed understanding and agreement with this plan.  The patient will follow-up with primary care and neurosurgery for reevaluation.      Orders placed during this visit:  Orders Placed This Encounter   Procedures    CT Head Without Contrast       ED Course:    ED Course as of 10/14/23 0420   Sat Oct 14, 2023   0417 Summary this is a 48-year-old female with history of obstructive hydrocephalus status post  shunt who follows with Dr. Cárdenas with neurosurgery who presents to the ED today with complaints of headache with concern for possible shunt malfunction.  No acute or emergent findings demonstrated physical exam.  CT head demonstrates 3 stable ventriculostomy catheters with no evidence of hydrocephalus.  Headache treated in ED and patient instructed on symptomatic care and outpatient follow-up with neurosurgery as recommended.  Patient and family agreeable plan of care.  Clear return precautions provided. [JG]      ED Course User Index  [JG] Huber Paredes PA            DIAGNOSIS  Final diagnoses:   Generalized headache   S/P  shunt       DISPOSITION    ED Disposition       ED Disposition   Discharge    Condition   Stable    Comment   --               Please note that portions of this document were completed with voice recognition software.        Huber Paredes PA  10/14/23  2440

## 2023-10-14 NOTE — DISCHARGE INSTRUCTIONS
Symptomatic care is recommended. Take all medications as prescribed and instructed. Follow up with primary care and neurosurgery as directed or return to Emergency Department with worsening of symptoms.